# Patient Record
Sex: MALE | URBAN - METROPOLITAN AREA
[De-identification: names, ages, dates, MRNs, and addresses within clinical notes are randomized per-mention and may not be internally consistent; named-entity substitution may affect disease eponyms.]

---

## 2020-11-18 ENCOUNTER — OFFICE VISIT (OUTPATIENT)
Dept: URGENT CARE | Age: 30
End: 2020-11-18
Payer: COMMERCIAL

## 2020-11-18 VITALS — OXYGEN SATURATION: 97 % | TEMPERATURE: 99.6 F | RESPIRATION RATE: 18 BRPM | HEART RATE: 72 BPM

## 2020-11-18 DIAGNOSIS — R09.89 RUNNY NOSE: ICD-10-CM

## 2020-11-18 DIAGNOSIS — R05.9 COUGH: Primary | ICD-10-CM

## 2020-11-18 PROCEDURE — 99202 OFFICE O/P NEW SF 15 MIN: CPT | Performed by: NURSE PRACTITIONER

## 2020-11-18 NOTE — PROGRESS NOTES
Subjective: (As above and below)       This patient was seen in [facility] at [address] [place] [address] Urgent Care while outdoors at their vehicle due to COVID-19 pandemic with PPE and focused examination in order to decrease community viral transmission. The patient/guardian gave verbal consent to treat. Chief Complaint   Patient presents with    Cough     Pt. c/o cough runny nose for 2days ago      Vidya Harrington is a 31 y.o. male who presents for evaluation of : dry tight cough with nasal congestion and runny nose. Symptom onset 2-3 days ago . Preceding illness: none. No other identified aggravating or alleviating factors. Symptoms are constant and overall unchanged. Promotes no decrease in PO intake of fluids. Denies: severe lethargy, SOB, syncope/near syncope, vomiting/diarrhea, chest pain, chest pain with breathing, labored breathing, severe headache, non-intractable fevers . Recent travel: no  Known Exposure to COVID-19: no known  Known flu or strep contact: no         Review of Systems - negative except as listed above    Reviewed PmHx, RxHx, FmHx, SocHx, AllgHx and updated in chart. History reviewed. No pertinent family history. History reviewed. No pertinent past medical history. Social History     Socioeconomic History    Marital status: UNKNOWN     Spouse name: Not on file    Number of children: Not on file    Years of education: Not on file    Highest education level: Not on file   Tobacco Use    Smoking status: Former Smoker    Smokeless tobacco: Never Used          No current outpatient medications on file. No current facility-administered medications for this visit. Objective:     Vitals:    11/18/20 1232   Pulse: 72   Resp: 18   Temp: 99.6 °F (37.6 °C)   SpO2: 97%       Physical Exam  General appearance - appears well hydrated and does not appear toxic, no acute distress  Eyes - EOMs intact. Non injected.  No scleral icterus   Ears - no external swelling  Nose - nasal sniffling. No purulent drainage  Mouth - OP mild erythema without swelling, exudate or lesion. Mucus membranes moist. Uvula midline. Neck/Lymphatics - trachea midline, full AROM  Chest - normal breathing effort no wheeze rales or rhonchi bilat  Heart - RRR no murmurs   Skin - no observable rashes or pallor  Neurologic- alert and oriented x 3  Psychiatric- normal mood, behavior and though content. Assessment/ Plan:     1. Cough  Plan:  DDx: viral URI, COVID-19, sinusitis, seasonal allergies  No evidence suggesting complication of illness at this time. Will discharge home with close monitoring and follow up. Supportive home care for mild symptoms advised- maintain adequate fluid intake, lozenges, over the counter Tylenol (for fever, aches, pains, chills)  Patient is aware that the differential includes COVID-19 and was advised the following: self isolation/quarantine based on current CDC guidelines, possibilities of false negatives and importance of adhering to guidelines regardless or results.    - NOVEL CORONAVIRUS (COVID-19)    2. Runny nose    - NOVEL CORONAVIRUS (COVID-19)            Follow up: We have reviewed, in detail, particular presentations/worsening/concerning signs and symptoms that may warrant seeking immediate care in the ED setting. For other non-severe changes, non-improvement or questions, patient aware to contact PCP office or consider a virtual online medical consultation.         Christel Clements NP

## 2020-11-18 NOTE — LETTER
NOTIFICATION RETURN TO WORK / SCHOOL 
 
11/18/2020 1:06 PM 
 
Mr. Zonia Daniels [id number] [facility] [address] To Whom It May Concern: 
 
Zonia Daniels is currently under the care of [address]. Please allow to quarantine/self isolate until: 111/26/2020 If there are questions or concerns please have the patient contact our office. Sincerely, G[facility] PROVIDER

## 2020-11-24 LAB — SARS-COV-2, NAA: NOT DETECTED
